# Patient Record
Sex: MALE | Race: WHITE | NOT HISPANIC OR LATINO | Employment: OTHER | URBAN - METROPOLITAN AREA
[De-identification: names, ages, dates, MRNs, and addresses within clinical notes are randomized per-mention and may not be internally consistent; named-entity substitution may affect disease eponyms.]

---

## 2023-03-13 ENCOUNTER — TELEPHONE (OUTPATIENT)
Dept: PRIMARY CARE | Facility: CLINIC | Age: 68
End: 2023-03-13

## 2023-03-13 NOTE — TELEPHONE ENCOUNTER
Patient needs a refil on Atorvastatin 20mg, Metoprolol 50mg and Metformin 4mg to [facility] in [place]

## 2023-03-15 DIAGNOSIS — E11.9 TYPE 2 DIABETES MELLITUS WITHOUT COMPLICATION, WITHOUT LONG-TERM CURRENT USE OF INSULIN (MULTI): ICD-10-CM

## 2023-03-15 DIAGNOSIS — E78.2 MIXED HYPERLIPIDEMIA: ICD-10-CM

## 2023-03-15 DIAGNOSIS — I10 PRIMARY HYPERTENSION: Primary | ICD-10-CM

## 2023-03-15 RX ORDER — AMOXICILLIN 500 MG
CAPSULE ORAL 2 TIMES DAILY
COMMUNITY
End: 2023-12-06 | Stop reason: ALTCHOICE

## 2023-03-15 RX ORDER — GLIMEPIRIDE 4 MG/1
4 TABLET ORAL 2 TIMES DAILY
COMMUNITY
End: 2023-03-16 | Stop reason: SDUPTHER

## 2023-03-15 RX ORDER — LISINOPRIL 20 MG/1
20 TABLET ORAL DAILY
COMMUNITY
End: 2023-03-16 | Stop reason: SDUPTHER

## 2023-03-15 RX ORDER — EZETIMIBE 10 MG/1
10 TABLET ORAL DAILY
COMMUNITY
End: 2023-03-16 | Stop reason: SDUPTHER

## 2023-03-15 RX ORDER — DULAGLUTIDE 3 MG/.5ML
3 INJECTION, SOLUTION SUBCUTANEOUS
COMMUNITY
End: 2023-04-03 | Stop reason: SDUPTHER

## 2023-03-15 RX ORDER — MECLIZINE HYDROCHLORIDE 25 MG/1
25 TABLET ORAL 3 TIMES DAILY PRN
COMMUNITY
Start: 2018-02-15 | End: 2023-12-06 | Stop reason: ALTCHOICE

## 2023-03-15 RX ORDER — ATORVASTATIN CALCIUM 20 MG/1
20 TABLET, FILM COATED ORAL DAILY
COMMUNITY
End: 2023-03-16 | Stop reason: SDUPTHER

## 2023-03-15 RX ORDER — AMLODIPINE BESYLATE 5 MG/1
5 TABLET ORAL DAILY
COMMUNITY
End: 2023-03-16 | Stop reason: ENTERED-IN-ERROR

## 2023-03-15 RX ORDER — METOPROLOL SUCCINATE 50 MG/1
50 TABLET, EXTENDED RELEASE ORAL DAILY
COMMUNITY
End: 2023-03-16 | Stop reason: SDUPTHER

## 2023-03-15 RX ORDER — DICLOFENAC SODIUM 20 MG/G
SOLUTION TOPICAL
COMMUNITY
Start: 2019-10-10 | End: 2023-12-06 | Stop reason: ALTCHOICE

## 2023-03-15 RX ORDER — ASPIRIN 81 MG/1
81 TABLET ORAL 2 TIMES DAILY
COMMUNITY

## 2023-03-15 RX ORDER — METFORMIN HYDROCHLORIDE 500 MG/1
1000 TABLET ORAL 2 TIMES DAILY
COMMUNITY
End: 2023-03-16 | Stop reason: SDUPTHER

## 2023-03-16 RX ORDER — METOPROLOL SUCCINATE 50 MG/1
TABLET, EXTENDED RELEASE ORAL
Qty: 90 TABLET | Refills: 3 | Status: SHIPPED | OUTPATIENT
Start: 2023-03-16 | End: 2023-04-03 | Stop reason: SDUPTHER

## 2023-03-16 RX ORDER — METFORMIN HYDROCHLORIDE 500 MG/1
TABLET ORAL
Qty: 360 TABLET | Refills: 3 | Status: SHIPPED | OUTPATIENT
Start: 2023-03-16 | End: 2023-04-03 | Stop reason: SDUPTHER

## 2023-03-16 RX ORDER — LISINOPRIL 20 MG/1
TABLET ORAL
Qty: 90 TABLET | Refills: 3 | Status: SHIPPED | OUTPATIENT
Start: 2023-03-16 | End: 2023-04-03 | Stop reason: SDUPTHER

## 2023-03-16 RX ORDER — AMLODIPINE BESYLATE 5 MG/1
TABLET ORAL
Qty: 90 TABLET | Refills: 3 | Status: SHIPPED | OUTPATIENT
Start: 2023-03-16 | End: 2023-04-03 | Stop reason: SDUPTHER

## 2023-03-16 RX ORDER — ATORVASTATIN CALCIUM 20 MG/1
TABLET, FILM COATED ORAL
Qty: 90 TABLET | Refills: 3 | Status: SHIPPED | OUTPATIENT
Start: 2023-03-16 | End: 2023-04-03 | Stop reason: SDUPTHER

## 2023-03-16 RX ORDER — EZETIMIBE 10 MG/1
TABLET ORAL
Qty: 90 TABLET | Refills: 3 | Status: SHIPPED | OUTPATIENT
Start: 2023-03-16 | End: 2023-04-03 | Stop reason: SDUPTHER

## 2023-03-16 RX ORDER — GLIMEPIRIDE 4 MG/1
TABLET ORAL
Qty: 180 TABLET | Refills: 3 | Status: SHIPPED | OUTPATIENT
Start: 2023-03-16 | End: 2023-04-03 | Stop reason: SDUPTHER

## 2023-03-16 NOTE — TELEPHONE ENCOUNTER
Patient called to follow up on refill request. He is completely out of Lipitor, Zetia, Metoprolol, and almost out of Metformin and trulicity . Please send to [facility] in [address]

## 2023-04-01 PROBLEM — H35.033 HYPERTENSIVE RETINOPATHY OF BOTH EYES: Status: ACTIVE | Noted: 2023-04-01

## 2023-04-01 PROBLEM — H54.61 VISION LOSS OF RIGHT EYE: Status: ACTIVE | Noted: 2023-04-01

## 2023-04-01 PROBLEM — R35.0 FREQUENCY OF URINATION: Status: ACTIVE | Noted: 2023-04-01

## 2023-04-01 PROBLEM — E66.9 CLASS 1 OBESITY WITH BODY MASS INDEX (BMI) OF 33.0 TO 33.9 IN ADULT: Status: ACTIVE | Noted: 2023-04-01

## 2023-04-01 PROBLEM — E11.59 TYPE 2 DIABETES MELLITUS WITH CIRCULATORY DISORDER (MULTI): Status: ACTIVE | Noted: 2023-04-01

## 2023-04-01 PROBLEM — E78.5 HYPERLIPIDEMIA: Status: ACTIVE | Noted: 2023-04-01

## 2023-04-01 PROBLEM — R80.9 PROTEINURIA: Status: ACTIVE | Noted: 2023-04-01

## 2023-04-01 PROBLEM — H52.7 REFRACTION ERROR: Status: ACTIVE | Noted: 2023-04-01

## 2023-04-01 PROBLEM — H04.123 DRY EYES, BILATERAL: Status: ACTIVE | Noted: 2023-04-01

## 2023-04-01 PROBLEM — I10 HYPERTENSION: Status: ACTIVE | Noted: 2023-04-01

## 2023-04-01 PROBLEM — H25.811 COMBINED FORM OF AGE-RELATED CATARACT, RIGHT EYE: Status: ACTIVE | Noted: 2023-04-01

## 2023-04-01 PROBLEM — E11.9 DIABETES (MULTI): Status: ACTIVE | Noted: 2023-04-01

## 2023-04-01 PROBLEM — G89.29 CHRONIC PAIN OF RIGHT KNEE: Status: ACTIVE | Noted: 2023-04-01

## 2023-04-01 PROBLEM — M25.511 PAIN, JOINT, SHOULDER REGION, RIGHT: Status: ACTIVE | Noted: 2023-04-01

## 2023-04-01 PROBLEM — M25.561 CHRONIC PAIN OF RIGHT KNEE: Status: ACTIVE | Noted: 2023-04-01

## 2023-04-01 PROBLEM — E55.9 VITAMIN D DEFICIENCY: Status: ACTIVE | Noted: 2023-04-01

## 2023-04-01 PROBLEM — H57.04: Status: ACTIVE | Noted: 2023-04-01

## 2023-04-01 RX ORDER — DICLOFENAC SODIUM 20 MG/G
1 SOLUTION TOPICAL 2 TIMES DAILY
COMMUNITY
End: 2023-12-06 | Stop reason: ALTCHOICE

## 2023-04-03 ENCOUNTER — OFFICE VISIT (OUTPATIENT)
Dept: PRIMARY CARE | Facility: CLINIC | Age: 68
End: 2023-04-03
Payer: MEDICARE

## 2023-04-03 VITALS
SYSTOLIC BLOOD PRESSURE: 120 MMHG | TEMPERATURE: 97.9 F | BODY MASS INDEX: 31.47 KG/M2 | WEIGHT: 219.8 LBS | HEIGHT: 70 IN | DIASTOLIC BLOOD PRESSURE: 80 MMHG

## 2023-04-03 DIAGNOSIS — R80.1 PERSISTENT PROTEINURIA: ICD-10-CM

## 2023-04-03 DIAGNOSIS — R53.83 FATIGUE, UNSPECIFIED TYPE: ICD-10-CM

## 2023-04-03 DIAGNOSIS — N52.9 ERECTILE DISORDER: ICD-10-CM

## 2023-04-03 DIAGNOSIS — E11.9 TYPE 2 DIABETES MELLITUS WITHOUT COMPLICATION, WITHOUT LONG-TERM CURRENT USE OF INSULIN (MULTI): ICD-10-CM

## 2023-04-03 DIAGNOSIS — G89.29 CHRONIC MIDLINE LOW BACK PAIN WITHOUT SCIATICA: ICD-10-CM

## 2023-04-03 DIAGNOSIS — E55.9 VITAMIN D DEFICIENCY: ICD-10-CM

## 2023-04-03 DIAGNOSIS — Z00.00 MEDICARE ANNUAL WELLNESS VISIT, SUBSEQUENT: Primary | ICD-10-CM

## 2023-04-03 DIAGNOSIS — I10 PRIMARY HYPERTENSION: ICD-10-CM

## 2023-04-03 DIAGNOSIS — E78.2 MIXED HYPERLIPIDEMIA: ICD-10-CM

## 2023-04-03 DIAGNOSIS — M54.50 CHRONIC MIDLINE LOW BACK PAIN WITHOUT SCIATICA: ICD-10-CM

## 2023-04-03 LAB
POC HDL CHOLESTEROL: 42 MG/DL (ref 0–40)
POC HEMOGLOBIN A1C: 9 % (ref 4.2–6.5)
POC LDL CHOLESTEROL: 64 MG/DL (ref 0–100)
POC NON-HDL CHOLESTEROL: 82 MG/DL (ref 0–130)
POC TOTAL CHOLESTEROL/HDL RATIO: 3 (ref 0–4.5)
POC TOTAL CHOLESTEROL: 124 MG/DL (ref 0–199)
POC TRIGLYCERIDES: 89 MG/DL (ref 0–150)

## 2023-04-03 PROCEDURE — 1036F TOBACCO NON-USER: CPT | Performed by: FAMILY MEDICINE

## 2023-04-03 PROCEDURE — 83036 HEMOGLOBIN GLYCOSYLATED A1C: CPT | Performed by: FAMILY MEDICINE

## 2023-04-03 PROCEDURE — 3079F DIAST BP 80-89 MM HG: CPT | Performed by: FAMILY MEDICINE

## 2023-04-03 PROCEDURE — 1160F RVW MEDS BY RX/DR IN RCRD: CPT | Performed by: FAMILY MEDICINE

## 2023-04-03 PROCEDURE — 80061 LIPID PANEL: CPT | Performed by: FAMILY MEDICINE

## 2023-04-03 PROCEDURE — 36416 COLLJ CAPILLARY BLOOD SPEC: CPT | Performed by: FAMILY MEDICINE

## 2023-04-03 PROCEDURE — 1159F MED LIST DOCD IN RCRD: CPT | Performed by: FAMILY MEDICINE

## 2023-04-03 PROCEDURE — 1170F FXNL STATUS ASSESSED: CPT | Performed by: FAMILY MEDICINE

## 2023-04-03 PROCEDURE — 3074F SYST BP LT 130 MM HG: CPT | Performed by: FAMILY MEDICINE

## 2023-04-03 PROCEDURE — G0439 PPPS, SUBSEQ VISIT: HCPCS | Performed by: FAMILY MEDICINE

## 2023-04-03 PROCEDURE — 4010F ACE/ARB THERAPY RXD/TAKEN: CPT | Performed by: FAMILY MEDICINE

## 2023-04-03 RX ORDER — ATORVASTATIN CALCIUM 20 MG/1
20 TABLET, FILM COATED ORAL DAILY
Qty: 90 TABLET | Refills: 3 | Status: SHIPPED | OUTPATIENT
Start: 2023-04-03 | End: 2023-08-08 | Stop reason: SDUPTHER

## 2023-04-03 RX ORDER — METOPROLOL SUCCINATE 50 MG/1
50 TABLET, EXTENDED RELEASE ORAL DAILY
Qty: 90 TABLET | Refills: 3 | Status: SHIPPED | OUTPATIENT
Start: 2023-04-03 | End: 2023-08-08 | Stop reason: SDUPTHER

## 2023-04-03 RX ORDER — LISINOPRIL 20 MG/1
20 TABLET ORAL DAILY
Qty: 90 TABLET | Refills: 3 | Status: SHIPPED | OUTPATIENT
Start: 2023-04-03 | End: 2023-08-08 | Stop reason: SDUPTHER

## 2023-04-03 RX ORDER — AMLODIPINE BESYLATE 5 MG/1
5 TABLET ORAL DAILY
Qty: 90 TABLET | Refills: 3 | Status: SHIPPED | OUTPATIENT
Start: 2023-04-03 | End: 2023-08-08 | Stop reason: SDUPTHER

## 2023-04-03 RX ORDER — METFORMIN HYDROCHLORIDE 500 MG/1
1000 TABLET ORAL
Qty: 360 TABLET | Refills: 3 | Status: SHIPPED | OUTPATIENT
Start: 2023-04-03 | End: 2023-08-08 | Stop reason: SDUPTHER

## 2023-04-03 RX ORDER — EZETIMIBE 10 MG/1
10 TABLET ORAL DAILY
Qty: 90 TABLET | Refills: 3 | Status: SHIPPED | OUTPATIENT
Start: 2023-04-03 | End: 2023-08-08 | Stop reason: SDUPTHER

## 2023-04-03 RX ORDER — DULAGLUTIDE 3 MG/.5ML
3 INJECTION, SOLUTION SUBCUTANEOUS
Qty: 1 ML | Refills: 11 | Status: SHIPPED | OUTPATIENT
Start: 2023-04-03 | End: 2023-08-08 | Stop reason: SDUPTHER

## 2023-04-03 RX ORDER — GLIMEPIRIDE 4 MG/1
4 TABLET ORAL 2 TIMES DAILY
Qty: 180 TABLET | Refills: 3 | Status: SHIPPED | OUTPATIENT
Start: 2023-04-03 | End: 2023-08-08 | Stop reason: SDUPTHER

## 2023-04-03 ASSESSMENT — ACTIVITIES OF DAILY LIVING (ADL)
BATHING: INDEPENDENT
DOING_HOUSEWORK: INDEPENDENT
TAKING_MEDICATION: INDEPENDENT
MANAGING_FINANCES: INDEPENDENT
GROCERY_SHOPPING: INDEPENDENT
GROCERY_SHOPPING: INDEPENDENT
TAKING_MEDICATION: INDEPENDENT
DRESSING: INDEPENDENT
DRESSING: INDEPENDENT
MANAGING_FINANCES: INDEPENDENT
DOING_HOUSEWORK: INDEPENDENT
BATHING: INDEPENDENT

## 2023-04-03 ASSESSMENT — ENCOUNTER SYMPTOMS
ABDOMINAL PAIN: 0
AGITATION: 0
SEIZURES: 0
DECREASED CONCENTRATION: 0
BRUISES/BLEEDS EASILY: 0
EYE ITCHING: 0
WEAKNESS: 0
ABDOMINAL DISTENTION: 0
HEADACHES: 0
HEMATURIA: 0
ADENOPATHY: 0
FREQUENCY: 0
NAUSEA: 0
SORE THROAT: 0
FATIGUE: 0
COUGH: 0
SHORTNESS OF BREATH: 0
SINUS PRESSURE: 0
ANAL BLEEDING: 0
SLEEP DISTURBANCE: 0
EYE DISCHARGE: 0
APPETITE CHANGE: 0
NECK STIFFNESS: 0
TREMORS: 0
WHEEZING: 0
DIAPHORESIS: 0
BLOOD IN STOOL: 0
VOMITING: 0
NERVOUS/ANXIOUS: 0
EYE PAIN: 0
SPEECH DIFFICULTY: 0
VOICE CHANGE: 0
PHOTOPHOBIA: 0
TROUBLE SWALLOWING: 0
CHILLS: 0
DIZZINESS: 0
NECK PAIN: 0
PALPITATIONS: 0
UNEXPECTED WEIGHT CHANGE: 0
CHEST TIGHTNESS: 0
CONSTIPATION: 0
CONFUSION: 0
BACK PAIN: 0
DIARRHEA: 0
FEVER: 0
NUMBNESS: 0
ARTHRALGIAS: 0
EYE REDNESS: 0

## 2023-04-03 ASSESSMENT — PATIENT HEALTH QUESTIONNAIRE - PHQ9
2. FEELING DOWN, DEPRESSED OR HOPELESS: NOT AT ALL
2. FEELING DOWN, DEPRESSED OR HOPELESS: NOT AT ALL
1. LITTLE INTEREST OR PLEASURE IN DOING THINGS: NOT AT ALL
SUM OF ALL RESPONSES TO PHQ9 QUESTIONS 1 AND 2: 0
1. LITTLE INTEREST OR PLEASURE IN DOING THINGS: NOT AT ALL
SUM OF ALL RESPONSES TO PHQ9 QUESTIONS 1 AND 2: 0
2. FEELING DOWN, DEPRESSED OR HOPELESS: NOT AT ALL
SUM OF ALL RESPONSES TO PHQ9 QUESTIONS 1 AND 2: 0
1. LITTLE INTEREST OR PLEASURE IN DOING THINGS: NOT AT ALL

## 2023-04-03 NOTE — PROGRESS NOTES
Subjective   Reason for Visit: Bam Arriaza is an 67 y.o. male here for a Medicare Wellness visit.          Reviewed all medications by prescribing practitioner or clinical pharmacist (such as prescriptions, OTCs, herbal therapies and supplements) and documented in the medical record.    HPI  #1 Health Maintenance:  DTaP 2014, received another booster on January 8, 2020 in anticipation of his granddaughter's birth  PSA 5/21/2012 1.69 7/22/2013 1.06 slip written for another one in 12/15/2014, 7/15/2015 value 1.24, 6/28/2017 value 1.58, July 2018, 8/20/18 0.86 August 28, 2019 value 0.84  Coronary artery calcium scan: 2008 319, 6/21/2017 score 824.7 (7/3/2017 recommended appointment with Dr. Vaughn)  cardiac catheterization August 18, 2017 showed left main LAD and circumflex and right coronary all less than 30%  Pneumovax at age 65  Colonoscopy will refer to Dr. Gonzales he is planning on getting this done sometime during the spring of 2016, 9/29/2016 once he gets a couple more bills 3/24/2017 still pending, will do FIT test in interim, April 23, 2018 reminded, 8/9/2018 FIT, 12/4/2018 still pending, September 5, 2019 still pending, March 9, 2020 still pending  CMP this will be done after 7/22/2014 7/15/2015 (glucose 198), 99/29/2016, 6/28/2017 (glucose 176, total protein 6.2, BMP 12/5/2017 (glucose 198), 2/15/2018 (glucose 201), February 2019, August 28, 2019(glucose 200, total protein 6.3) plus CBC plus PSA 0.84+ TSH 1.2  August 25, 2022 CBC plus PSA 1.34+ vitamin D 43+ TSH 1.53+ CMP (glucose 130, total protein 6.1)    #2 Diabetes:  Maddie 10, 2019  Hemoglobin A1c 11.7%   microalbumin December 4, 2018 80 normal  Triglycerides 184  Sensory exam August 9, 2018 monofilament normal  Eye exam 2018 Dr. Agustin  Will increase metformin to 1 g twice daily, maintain glimepiride 4 mg twice a day and add Januvia 50 mg once a day.  If not significantly improved by next visit will refer to endocrinology    December 9,  2019  Hemoglobin A1c 7.9%  Microalbumin September 30, 2019 80 normal  Triglycerides 160  Sensory exam August 9, 2018 monofilament normal  Eye exam Dr. Agustin 2018  Has been taking the Januvia regularly and has not results have improved. We will plan on going to 100 mg if trend does not continue.    March 9, 2020  Hemoglobin A1c 7.6%  Microalbumin September 30, 2019 80 normal  Triglycerides 151  Sensory exam August 19, 2018 monofilament normal  Eye exam Dr. Agustin 2018  His hemoglobin A1c continues to improve.    August 31, 2020  Hemoglobin A1c 9%  Microalbumin August 31, 2020 albumin 150 creatinine 200 AC ratio  abnormal  Triglycerides 144  Sensory exam monofilament August 19, 2018 normal  Eye exam Dr. Agustin 2018  He admits to drinking some regular pop and has not been as vigilant since the [place] COVID 19. We will update his flu shot today, starting tomorrow he will be eligible for Medicare.    November 17, 2020    February 10, 2021  Hemoglobin A1c 8.9%  Triglycerides 145  Lipids completed  Medicare wellness exam completed. Will split metoprolol in half and take one half twice daily    May 7, 2021  Hemoglobin A1c 8.8%  Triglycerides 121  Lipids total 111 HDL 31 ratio 3.6 LDL 56 triglycerides 121  Patient just had microalbumin completed will check at the time of next visit in 3 months.    September 9, 2021  Hemoglobin A1c 8.6%  Triglycerides 134  Lipids total 102 HDL 40 ratio 5.2 LDL 56 triglycerides 134  Microalbumin September 9, 2021 albumin 30 urine creatinine 100 AC ratio abnormal     November 4, 2021  Hemoglobin A1c 8.5%  Triglycerides 115  Lipids total 110 HDL 31 ratio 3.5 LDL 55 triglycerides 118  Microalbumin September 9, 2021 albumin 30 urine creatinine 100 AC ratio     March 1, 2022  Hemoglobin A1c 7.7%  Triglycerides 152  Lipids total 132 HDL 36 ratio 3.7 LDL 66 triglycerides 152 which is also he is getting his appointment from March    November 3, 2022  Hemoglobin  8.4%  Triglycerides  Lipids    April 3rd 2023  A1C 9.0%  TG 89  Patient is on Trulicity 3 mg and still has not been successful in bringing his A1c down.  He is also still on glimepiride 4 mg twice a day and metformin 1000 mg twice daily.  I am referring him to endocrinology for diabetic management        #3 Hyperlipidemia:  Maddie 10, 2019 total 146 HDL 34 ratio 4.3 LDL 75 triglycerides 184  March 9, 2020 total 133 HDL 35 ratio 3.8 LDL 67 triglycerides 150  August 31, 2020 total 102 HDL 29 ratio 3.6 LDL 45 triglycerides 144  November 17, 2020 total 110 HDL 33 ratio 3.3 LDL 51 triglycerides 129  February 10, 2021 total 118 HDL 33 ratio 3.6 LDL 56 triglycerides 143  May 7, 2021 total 111 HDL 31 ratio 3.6 LDL 56 triglycerides 121  September 9, 2021 total 102 HDL 20 ratio 5.2 LDL 56 triglycerides 134  November 4, 2021 total 110 HDL 31 ratio 3.5 LDL 55 triglycerides 118  March 1, 2022 total 132 HDL 36 ratio 3.7 LDL 66 triglycerides 152      April 3, 2023 total 124 HDL 42 ratio   3:1 LDL 64 triglycerides 89      #4 Hypertension:  The patient is taking amlodipine 5 mg once a day and lisinopril 10 mg once a day   12/4/2018 blood pressure 150/80 weight 236 pounds  Maddie 10, 2019 blood pressure 130/86 weight 228 pounds  August 31, 2020 blood pressure 130/80 weight 224 pounds  April 3, 2023 blood pressure 120/80 weight 219 pounds    April 3, 2023   this 67-year-old male is here today for his Medicare wellness exam.  His hemoglobin A1c remains elevated at 9 despite started on Trulicity 3 mg.  I am sending him to endocrinology for an opinion regarding management of his diabetes.    March 1, 2022  This 66-year-old male is here today for his Medicare wellness exam. He is also here to follow-up on his diabetes hyperlipidemia as well as his right hip. He would like a referral to a surgeon closer and I will send him to Dr. Hayes at the [facility]. He also slipped on the ice a week ago yesterday and landed on his bottom with  his hands behind him trying to break the fall. As a result he has significant back pain.  He was able to get his hemoglobin A1c down to 7.7%. He has not started taking the Trulicity. He recently changed to [facility]. Trulicity is covered although his Januvia is not. I will plan on seeing him back in 6 months for his normal follow-up.    November 4, 2021  This 66-year-old male is here today to follow-up on his diabetes. Unfortunately his numbers are not coming down very well.    March 9, 2020  Results continue to improve. We will renew medications for 6 months he will be due at that time for PSA CMP TSH CBC and microalbumin and fit test.     12/4/2018  Patient is back on his medications but unfortunately his hemoglobin A1c has gone up to 10.5%. We will increase his glimepiride 4 mg in the morning and 2 mg with dinner    8/9/2018  This 62-year-old males here today to follow-up on his diabetes. Unfortunately his hemoglobin A1c has gone up to 9.3%. He attributes this to losing his health insurance. He otherwise has been feeling well and is now covered under his wife's plan. He continues to have significant protein leakage.  I will plan on seeing him back in 3 months. We will at that time recheck his regular labs. He will need an additional lab work including CMP and CBC in 6 months.    April 23, 2018  Patient had run out of atorvastatin. He is due back in 3 months. He needs to get his urine rechecked at that time.    1/4/2018  Patient did get in to see Dr. Agustin. His prescription is changing. He has been feeling well since his last visit and other than some occasional feelings of disequilibrium he is otherwise doing well and his blood pressure is much better with 20 mg of lisinopril. I will plan on seeing him back in 3 months with a significant emphasis on weight loss and exercise    12/19/2017  This 62-year-old male was evaluated in the emergency room at [facility] on December 5 for increase in blood pressure and some visual  changes that occurred while he was sitting at his desk working. An MRI and CT scan was done and did not reveal any specific abnormalities. He was diagnosed as having a TIA. It was recommended he follow-up with his regular family doctor regarding better control of his blood pressure. Since the episode he has had difficulty seeing his computer without using a magnifying glass. He had an appointment set up in February to see an ophthalmologist we have move that appointment up to December 26. I am also increasing his lisinopril to 20 mg once a day. He will continue to take metoprolol 50 mg once a day and amlodipine 5 mg once a day. His repeat blood pressure taken by me was 150/80. When on seeing him back in 2 weeks to recheck his blood pressure.     Patient Care Team:  Howie Tafoya MD as PCP - General  Howie Tafoya MD as PCP - [employer] PCP     Review of Systems   Constitutional:  Negative for appetite change, chills, diaphoresis, fatigue, fever and unexpected weight change.   HENT:  Negative for congestion, ear discharge, ear pain, hearing loss, nosebleeds, sinus pressure, sore throat, tinnitus, trouble swallowing and voice change.    Eyes:  Negative for photophobia, pain, discharge, redness, itching and visual disturbance.   Respiratory:  Negative for cough, chest tightness, shortness of breath and wheezing.    Cardiovascular:  Negative for chest pain, palpitations and leg swelling.   Gastrointestinal:  Negative for abdominal distention, abdominal pain, anal bleeding, blood in stool, constipation, diarrhea, nausea and vomiting.   Endocrine: Negative for polyuria.   Genitourinary:  Negative for frequency and hematuria.   Musculoskeletal:  Negative for arthralgias, back pain, neck pain and neck stiffness.   Skin:  Negative for rash.   Allergic/Immunologic: Negative for environmental allergies and food allergies.   Neurological:  Negative for dizziness, tremors, seizures, syncope, speech  "difficulty, weakness, numbness and headaches.   Hematological:  Negative for adenopathy. Does not bruise/bleed easily.   Psychiatric/Behavioral:  Negative for agitation, behavioral problems, confusion, decreased concentration, sleep disturbance and suicidal ideas. The patient is not nervous/anxious.        Objective   Vitals:  /80 (BP Location: Right arm, Patient Position: Sitting, BP Cuff Size: Adult)   Temp 36.6 °C (97.9 °F) (Temporal)   Ht 1.777 m (5' 9.96\")   Wt 99.7 kg (219 lb 12.8 oz)   BMI 31.57 kg/m²       Physical Exam  Constitutional:       General: He is not in acute distress.     Appearance: Normal appearance. He is normal weight. He is not ill-appearing.   HENT:      Head: Normocephalic.      Right Ear: Tympanic membrane and ear canal normal. There is no impacted cerumen.      Left Ear: Tympanic membrane and ear canal normal.      Nose: Nose normal. No congestion or rhinorrhea.      Mouth/Throat:      Mouth: Mucous membranes are moist.      Pharynx: Oropharynx is clear.   Eyes:      Extraocular Movements: Extraocular movements intact.      Conjunctiva/sclera: Conjunctivae normal.      Pupils: Pupils are equal, round, and reactive to light.   Neck:      Vascular: No carotid bruit.   Cardiovascular:      Rate and Rhythm: Normal rate and regular rhythm.      Pulses: Normal pulses.      Heart sounds: Normal heart sounds. No murmur heard.  Pulmonary:      Effort: Pulmonary effort is normal. No respiratory distress.      Breath sounds: No wheezing or rales.   Abdominal:      General: Abdomen is flat. Bowel sounds are normal. There is no distension.      Palpations: There is no mass.      Tenderness: There is no abdominal tenderness. There is no guarding or rebound.      Hernia: No hernia is present.   Genitourinary:     Rectum: Normal.   Musculoskeletal:         General: No swelling, tenderness or deformity. Normal range of motion.      Cervical back: Normal range of motion and neck supple. No " tenderness.      Right lower leg: No edema.      Left lower leg: No edema.   Lymphadenopathy:      Cervical: No cervical adenopathy.   Skin:     General: Skin is warm and dry.      Findings: No erythema or rash.   Neurological:      Mental Status: He is alert.         Assessment/Plan   Problem List Items Addressed This Visit          Circulatory    Hypertension    Relevant Medications    atorvastatin (Lipitor) 20 mg tablet    glimepiride (Amaryl) 4 mg tablet    amLODIPine (Norvasc) 5 mg tablet    metoprolol succinate XL (Toprol-XL) 50 mg 24 hr tablet    lisinopril 20 mg tablet       Endocrine/Metabolic    Diabetes (CMS/HCC)    Relevant Medications    glimepiride (Amaryl) 4 mg tablet    lisinopril 20 mg tablet    metFORMIN (Glucophage) 500 mg tablet    dulaglutide (Trulicity) 3 mg/0.5 mL pen injector    Other Relevant Orders    Collection of Capillary Blood Specimen    POCT glycosylated hemoglobin (Hb A1C) manually resulted (Completed)    Referral to Endocrinology    Vitamin D deficiency       Other    Hyperlipidemia    Relevant Medications    ezetimibe (Zetia) 10 mg tablet    atorvastatin (Lipitor) 20 mg tablet    glimepiride (Amaryl) 4 mg tablet    Other Relevant Orders    Collection of Capillary Blood Specimen    POCT Lipid Panel manually resulted (Completed)    Proteinuria     Other Visit Diagnoses       Medicare annual wellness visit, subsequent    -  Primary    Erectile disorder        Chronic midline low back pain without sciatica        Fatigue, unspecified type

## 2023-08-08 ENCOUNTER — OFFICE VISIT (OUTPATIENT)
Dept: PRIMARY CARE | Facility: CLINIC | Age: 68
End: 2023-08-08
Payer: MEDICARE

## 2023-08-08 VITALS — BODY MASS INDEX: 31.34 KG/M2 | WEIGHT: 218.2 LBS | DIASTOLIC BLOOD PRESSURE: 88 MMHG | SYSTOLIC BLOOD PRESSURE: 160 MMHG

## 2023-08-08 DIAGNOSIS — E78.5 HYPERLIPIDEMIA, UNSPECIFIED HYPERLIPIDEMIA TYPE: ICD-10-CM

## 2023-08-08 DIAGNOSIS — Z12.5 SCREENING FOR PROSTATE CANCER: ICD-10-CM

## 2023-08-08 DIAGNOSIS — E11.9 TYPE 2 DIABETES MELLITUS WITHOUT COMPLICATION, WITHOUT LONG-TERM CURRENT USE OF INSULIN (MULTI): ICD-10-CM

## 2023-08-08 DIAGNOSIS — E78.2 MIXED HYPERLIPIDEMIA: ICD-10-CM

## 2023-08-08 DIAGNOSIS — I10 PRIMARY HYPERTENSION: ICD-10-CM

## 2023-08-08 DIAGNOSIS — E13.9 DIABETES 1.5, MANAGED AS TYPE 2 (MULTI): Primary | ICD-10-CM

## 2023-08-08 LAB
POC HDL CHOLESTEROL: 28 MG/DL (ref 0–40)
POC HEMOGLOBIN A1C: 8 % (ref 4.2–6.5)
POC LDL CHOLESTEROL: 53 MG/DL (ref 0–100)
POC NON-HDL CHOLESTEROL: 72 MG/DL (ref 0–130)
POC TOTAL CHOLESTEROL/HDL RATIO: 3.6 (ref 0–4.5)
POC TOTAL CHOLESTEROL: 100 MG/DL (ref 0–199)
POC TRIGLYCERIDES: 98 MG/DL (ref 0–150)

## 2023-08-08 PROCEDURE — 83036 HEMOGLOBIN GLYCOSYLATED A1C: CPT | Performed by: INTERNAL MEDICINE

## 2023-08-08 PROCEDURE — 1160F RVW MEDS BY RX/DR IN RCRD: CPT | Performed by: INTERNAL MEDICINE

## 2023-08-08 PROCEDURE — 1036F TOBACCO NON-USER: CPT | Performed by: INTERNAL MEDICINE

## 2023-08-08 PROCEDURE — 99213 OFFICE O/P EST LOW 20 MIN: CPT | Performed by: INTERNAL MEDICINE

## 2023-08-08 PROCEDURE — 1159F MED LIST DOCD IN RCRD: CPT | Performed by: INTERNAL MEDICINE

## 2023-08-08 PROCEDURE — 4010F ACE/ARB THERAPY RXD/TAKEN: CPT | Performed by: INTERNAL MEDICINE

## 2023-08-08 PROCEDURE — 3079F DIAST BP 80-89 MM HG: CPT | Performed by: INTERNAL MEDICINE

## 2023-08-08 PROCEDURE — 3077F SYST BP >= 140 MM HG: CPT | Performed by: INTERNAL MEDICINE

## 2023-08-08 PROCEDURE — 80061 LIPID PANEL: CPT | Performed by: INTERNAL MEDICINE

## 2023-08-08 RX ORDER — DULAGLUTIDE 3 MG/.5ML
3 INJECTION, SOLUTION SUBCUTANEOUS
Qty: 1 ML | Refills: 3 | Status: SHIPPED | OUTPATIENT
Start: 2023-08-08 | End: 2024-05-06

## 2023-08-08 RX ORDER — EZETIMIBE 10 MG/1
10 TABLET ORAL DAILY
Qty: 90 TABLET | Refills: 1 | Status: SHIPPED | OUTPATIENT
Start: 2023-08-08 | End: 2024-03-14

## 2023-08-08 RX ORDER — AMLODIPINE BESYLATE 5 MG/1
5 TABLET ORAL DAILY
Qty: 90 TABLET | Refills: 0 | Status: SHIPPED | OUTPATIENT
Start: 2023-08-08 | End: 2023-12-06 | Stop reason: SDUPTHER

## 2023-08-08 RX ORDER — LISINOPRIL 20 MG/1
20 TABLET ORAL DAILY
Qty: 90 TABLET | Refills: 0 | Status: SHIPPED | OUTPATIENT
Start: 2023-08-08 | End: 2024-02-28

## 2023-08-08 RX ORDER — ATORVASTATIN CALCIUM 20 MG/1
20 TABLET, FILM COATED ORAL DAILY
Qty: 90 TABLET | Refills: 0 | Status: SHIPPED | OUTPATIENT
Start: 2023-08-08 | End: 2023-09-13

## 2023-08-08 RX ORDER — GLIMEPIRIDE 4 MG/1
4 TABLET ORAL 2 TIMES DAILY
Qty: 180 TABLET | Refills: 1 | Status: SHIPPED | OUTPATIENT
Start: 2023-08-08 | End: 2023-12-13

## 2023-08-08 RX ORDER — METFORMIN HYDROCHLORIDE 1000 MG/1
1000 TABLET ORAL
Qty: 90 TABLET | Refills: 1 | Status: SHIPPED | OUTPATIENT
Start: 2023-08-08 | End: 2023-09-13

## 2023-08-08 RX ORDER — METOPROLOL SUCCINATE 50 MG/1
50 TABLET, EXTENDED RELEASE ORAL DAILY
Qty: 90 TABLET | Refills: 1 | Status: SHIPPED | OUTPATIENT
Start: 2023-08-08 | End: 2023-12-13

## 2023-08-08 ASSESSMENT — PATIENT HEALTH QUESTIONNAIRE - PHQ9
SUM OF ALL RESPONSES TO PHQ9 QUESTIONS 1 AND 2: 0
2. FEELING DOWN, DEPRESSED OR HOPELESS: NOT AT ALL
1. LITTLE INTEREST OR PLEASURE IN DOING THINGS: NOT AT ALL

## 2023-08-08 NOTE — PATIENT INSTRUCTIONS
Medicare wellness  visit in December.  Discussed lab result HbA1c goal would be 7-8%  He will start exercise  and will follow up with Endocrinologist.

## 2023-08-08 NOTE — PROGRESS NOTES
Subjective   Patient ID: [id number]   [facility] [place]    Bam Arriaza is a 67 y.o. male who presents for Labs Only (Would like in office Lipid and A1C done. Also, comprehensive blood work.  ) and Dizziness.  He has been  seeing Dr Tafoya for many years. He need medication refill and blood work. He is doing very well.      Objective   Visit Vitals  /88 (BP Location: Left arm, Patient Position: Sitting)      Review of Systems  All 12 systems reviewed, no other abnormality except that mentioned in HPI.    Physical Exam  Constitutional- no abnormality  ENT- no abnormality  Neck- no swelling  CVS- normal S1 and S2, no murmur.  Pulmonary- clear to auscultation,no rhonchi, no wheezes.  Abdomen- normal- liver and spleen, soft, no distension, bowel sound present.  Neurological- all cranial nerves intact, speech and gait normal, no sensory or motor deficiency.  Musculoskeletal- all pulses are normal, normal movement, no joint swelling.  Skin- no rash, dry.  psychiatry- no suicidal ideation.  Lymph node- no lyphadenopathy      Assessment/Plan   Problem List Items Addressed This Visit       Diabetes (CMS/[facility])    Relevant Medications    dulaglutide (Trulicity) 3 mg/0.5 mL pen injector    glimepiride (Amaryl) 4 mg tablet    lisinopril 20 mg tablet    metFORMIN (Glucophage) 1,000 mg tablet    Hyperlipidemia    Relevant Medications    atorvastatin (Lipitor) 20 mg tablet    ezetimibe (Zetia) 10 mg tablet    glimepiride (Amaryl) 4 mg tablet    Other Relevant Orders    POCT Lipid Panel manually resulted (Completed)    Hypertension    Relevant Medications    amLODIPine (Norvasc) 5 mg tablet    atorvastatin (Lipitor) 20 mg tablet    glimepiride (Amaryl) 4 mg tablet    lisinopril 20 mg tablet    metoprolol succinate XL (Toprol-XL) 50 mg 24 hr tablet     Other Visit Diagnoses       Diabetes 1.5, managed as type 2 (CMS/[facility])    -  Primary    Relevant Orders    POCT glycosylated hemoglobin (Hb A1C) manually resulted (Completed)     Comprehensive Metabolic Panel    Screening for prostate cancer        Relevant Orders    Prostate Spec.Ag,Screen        Next medicare wellness visit April 2024.    Julio Meza MD

## 2023-11-30 ENCOUNTER — LAB (OUTPATIENT)
Dept: LAB | Facility: LAB | Age: 68
End: 2023-11-30
Payer: MEDICARE

## 2023-11-30 DIAGNOSIS — Z12.5 SCREENING FOR PROSTATE CANCER: ICD-10-CM

## 2023-11-30 DIAGNOSIS — E11.65 TYPE 2 DIABETES MELLITUS WITH HYPERGLYCEMIA (MULTI): Primary | ICD-10-CM

## 2023-11-30 DIAGNOSIS — E13.9 DIABETES 1.5, MANAGED AS TYPE 2 (MULTI): ICD-10-CM

## 2023-11-30 LAB
ALBUMIN SERPL BCP-MCNC: 3.9 G/DL (ref 3.4–5)
ALP SERPL-CCNC: 53 U/L (ref 33–136)
ALT SERPL W P-5'-P-CCNC: 46 U/L (ref 10–52)
ANION GAP SERPL CALC-SCNC: 13 MMOL/L (ref 10–20)
AST SERPL W P-5'-P-CCNC: 36 U/L (ref 9–39)
BILIRUB SERPL-MCNC: 1.5 MG/DL (ref 0–1.2)
BUN SERPL-MCNC: 15 MG/DL (ref 6–23)
CALCIUM SERPL-MCNC: 9.5 MG/DL (ref 8.6–10.3)
CHLORIDE SERPL-SCNC: 104 MMOL/L (ref 98–107)
CO2 SERPL-SCNC: 26 MMOL/L (ref 21–32)
CREAT SERPL-MCNC: 0.9 MG/DL (ref 0.5–1.3)
CREAT UR-MCNC: 118.5 MG/DL (ref 20–370)
CREAT UR-MCNC: 119.4 MG/DL (ref 20–370)
GFR SERPL CREATININE-BSD FRML MDRD: >90 ML/MIN/1.73M*2
GLUCOSE SERPL-MCNC: 108 MG/DL (ref 74–99)
MICROALBUMIN UR-MCNC: 398.2 MG/L
MICROALBUMIN/CREAT UR: 333.5 UG/MG CREAT
POTASSIUM SERPL-SCNC: 4.4 MMOL/L (ref 3.5–5.3)
PROT SERPL-MCNC: 6.8 G/DL (ref 6.4–8.2)
PSA SERPL-MCNC: 0.88 NG/ML
SODIUM SERPL-SCNC: 139 MMOL/L (ref 136–145)
TSH SERPL-ACNC: 0.74 MIU/L (ref 0.44–3.98)

## 2023-11-30 PROCEDURE — 80053 COMPREHEN METABOLIC PANEL: CPT

## 2023-11-30 PROCEDURE — 82570 ASSAY OF URINE CREATININE: CPT

## 2023-11-30 PROCEDURE — 83036 HEMOGLOBIN GLYCOSYLATED A1C: CPT

## 2023-11-30 PROCEDURE — 36415 COLL VENOUS BLD VENIPUNCTURE: CPT

## 2023-11-30 PROCEDURE — G0103 PSA SCREENING: HCPCS

## 2023-11-30 PROCEDURE — 84443 ASSAY THYROID STIM HORMONE: CPT

## 2023-11-30 PROCEDURE — 82043 UR ALBUMIN QUANTITATIVE: CPT

## 2023-12-01 ENCOUNTER — TELEPHONE (OUTPATIENT)
Dept: PRIMARY CARE | Facility: CLINIC | Age: 68
End: 2023-12-01
Payer: MEDICARE

## 2023-12-01 LAB
EST. AVERAGE GLUCOSE BLD GHB EST-MCNC: 160 MG/DL
HBA1C MFR BLD: 7.2 %

## 2023-12-01 NOTE — TELEPHONE ENCOUNTER
----- Message from Julio Meza MD sent at 12/1/2023 10:08 AM EST -----  CMP- looks ok, Bili- little high abut  was elevated before, but liver enzymes normal.  ----- Message -----  From: Lab, Background User  Sent: 11/30/2023   5:14 PM EST  To: Julio Meza MD

## 2023-12-06 ENCOUNTER — OFFICE VISIT (OUTPATIENT)
Dept: PRIMARY CARE | Facility: CLINIC | Age: 68
End: 2023-12-06
Payer: MEDICARE

## 2023-12-06 VITALS
TEMPERATURE: 97.2 F | OXYGEN SATURATION: 97 % | BODY MASS INDEX: 30.05 KG/M2 | SYSTOLIC BLOOD PRESSURE: 150 MMHG | HEART RATE: 84 BPM | DIASTOLIC BLOOD PRESSURE: 80 MMHG | WEIGHT: 209.2 LBS

## 2023-12-06 DIAGNOSIS — I10 PRIMARY HYPERTENSION: ICD-10-CM

## 2023-12-06 DIAGNOSIS — J01.90 ACUTE SINUSITIS, RECURRENCE NOT SPECIFIED, UNSPECIFIED LOCATION: Primary | ICD-10-CM

## 2023-12-06 PROCEDURE — 3051F HG A1C>EQUAL 7.0%<8.0%: CPT | Performed by: INTERNAL MEDICINE

## 2023-12-06 PROCEDURE — 1160F RVW MEDS BY RX/DR IN RCRD: CPT | Performed by: INTERNAL MEDICINE

## 2023-12-06 PROCEDURE — 4010F ACE/ARB THERAPY RXD/TAKEN: CPT | Performed by: INTERNAL MEDICINE

## 2023-12-06 PROCEDURE — 3062F POS MACROALBUMINURIA REV: CPT | Performed by: INTERNAL MEDICINE

## 2023-12-06 PROCEDURE — 99213 OFFICE O/P EST LOW 20 MIN: CPT | Performed by: INTERNAL MEDICINE

## 2023-12-06 PROCEDURE — 1159F MED LIST DOCD IN RCRD: CPT | Performed by: INTERNAL MEDICINE

## 2023-12-06 PROCEDURE — 3079F DIAST BP 80-89 MM HG: CPT | Performed by: INTERNAL MEDICINE

## 2023-12-06 PROCEDURE — 1036F TOBACCO NON-USER: CPT | Performed by: INTERNAL MEDICINE

## 2023-12-06 PROCEDURE — 3077F SYST BP >= 140 MM HG: CPT | Performed by: INTERNAL MEDICINE

## 2023-12-06 RX ORDER — AMLODIPINE BESYLATE 10 MG/1
10 TABLET ORAL DAILY
Qty: 90 TABLET | Refills: 1 | Status: SHIPPED | OUTPATIENT
Start: 2023-12-06 | End: 2024-05-06 | Stop reason: SDUPTHER

## 2023-12-06 RX ORDER — AZITHROMYCIN 250 MG/1
TABLET, FILM COATED ORAL
Qty: 6 TABLET | Refills: 0 | Status: SHIPPED | OUTPATIENT
Start: 2023-12-06 | End: 2023-12-11

## 2023-12-06 ASSESSMENT — PATIENT HEALTH QUESTIONNAIRE - PHQ9
2. FEELING DOWN, DEPRESSED OR HOPELESS: NOT AT ALL
SUM OF ALL RESPONSES TO PHQ9 QUESTIONS 1 AND 2: 0
1. LITTLE INTEREST OR PLEASURE IN DOING THINGS: NOT AT ALL

## 2023-12-06 ASSESSMENT — ENCOUNTER SYMPTOMS
LOSS OF SENSATION IN FEET: 0
OCCASIONAL FEELINGS OF UNSTEADINESS: 0
DEPRESSION: 0

## 2023-12-06 NOTE — PROGRESS NOTES
Subjective   Patient ID: [id number]   HPI    Bam Arriaza is a 68 y.o. male who presents for Diabetes.  He is following with endocrinologist regularly for his diabetes and his diabetes well-controlled his hemoglobin A1c came back 7.2 from 8.4.  He is maintaining diet and exercise with medications.  His blood pressure little bit high and discussed the consequence of high blood pressure.  And blood pressure medication increased to 10 mg p.o. daily.        Objective   Visit Vitals  /80 (BP Location: Left arm, Patient Position: Sitting, BP Cuff Size: Large adult)   Pulse 84   Temp 36.2 °C (97.2 °F) (Skin)      Review of Systems  All 12 systems reviewed, no other abnormality except that mentioned in HPI.    Physical Exam  Constitutional- no abnormality  ENT- no abnormality  Neck- no swelling  CVS- normal S1 and S2, no murmur.  Pulmonary- clear to auscultation,no rhonchi, no wheezes.  Abdomen- normal- liver and spleen, soft, no distension, bowel sound present.  Neurological- all cranial nerves intact, speech and gait normal, no sensory or motor deficiency.  Musculoskeletal- all pulses are normal, normal movement, no joint swelling.  Skin- no rash, dry.  psychiatry- no suicidal ideation.  Lymph node- no lymphadenopathy      Assessment/Plan   Problem List Items Addressed This Visit       Hypertension    Relevant Medications    amLODIPine (Norvasc) 10 mg tablet     Other Visit Diagnoses       Acute sinusitis, recurrence not specified, unspecified location    -  Primary    Relevant Medications    azithromycin (Zithromax) 250 mg tablet        He will follow-up with Dr. Fan.    Julio Meza MD

## 2024-01-31 DIAGNOSIS — E11.9 TYPE 2 DIABETES MELLITUS WITHOUT COMPLICATION, WITHOUT LONG-TERM CURRENT USE OF INSULIN (MULTI): ICD-10-CM

## 2024-02-01 RX ORDER — METFORMIN HYDROCHLORIDE 1000 MG/1
1000 TABLET ORAL
Qty: 180 TABLET | Refills: 0 | Status: SHIPPED | OUTPATIENT
Start: 2024-02-01 | End: 2024-05-06 | Stop reason: SDUPTHER

## 2024-02-28 DIAGNOSIS — I10 PRIMARY HYPERTENSION: ICD-10-CM

## 2024-02-28 DIAGNOSIS — E11.9 TYPE 2 DIABETES MELLITUS WITHOUT COMPLICATION, WITHOUT LONG-TERM CURRENT USE OF INSULIN (MULTI): ICD-10-CM

## 2024-02-28 RX ORDER — LISINOPRIL 20 MG/1
20 TABLET ORAL DAILY
Qty: 90 TABLET | Refills: 0 | Status: SHIPPED | OUTPATIENT
Start: 2024-02-28 | End: 2024-03-14

## 2024-03-13 DIAGNOSIS — I10 PRIMARY HYPERTENSION: ICD-10-CM

## 2024-03-13 DIAGNOSIS — E78.2 MIXED HYPERLIPIDEMIA: ICD-10-CM

## 2024-03-13 DIAGNOSIS — E11.9 TYPE 2 DIABETES MELLITUS WITHOUT COMPLICATION, WITHOUT LONG-TERM CURRENT USE OF INSULIN (MULTI): ICD-10-CM

## 2024-03-14 RX ORDER — EZETIMIBE 10 MG/1
10 TABLET ORAL DAILY
Qty: 90 TABLET | Refills: 0 | Status: SHIPPED | OUTPATIENT
Start: 2024-03-14 | End: 2024-05-06 | Stop reason: SDUPTHER

## 2024-03-14 RX ORDER — ATORVASTATIN CALCIUM 20 MG/1
20 TABLET, FILM COATED ORAL DAILY
Qty: 90 TABLET | Refills: 0 | Status: SHIPPED | OUTPATIENT
Start: 2024-03-14 | End: 2024-05-06 | Stop reason: SDUPTHER

## 2024-03-14 RX ORDER — METOPROLOL SUCCINATE 50 MG/1
50 TABLET, EXTENDED RELEASE ORAL DAILY
Qty: 90 TABLET | Refills: 0 | Status: SHIPPED | OUTPATIENT
Start: 2024-03-14 | End: 2024-05-06 | Stop reason: SDUPTHER

## 2024-03-14 RX ORDER — LISINOPRIL 20 MG/1
20 TABLET ORAL DAILY
Qty: 90 TABLET | Refills: 0 | Status: SHIPPED | OUTPATIENT
Start: 2024-03-14 | End: 2024-05-06 | Stop reason: SDUPTHER

## 2024-04-08 ENCOUNTER — APPOINTMENT (OUTPATIENT)
Dept: PRIMARY CARE | Facility: CLINIC | Age: 69
End: 2024-04-08
Payer: MEDICARE

## 2024-05-06 ENCOUNTER — OFFICE VISIT (OUTPATIENT)
Dept: PRIMARY CARE | Facility: CLINIC | Age: 69
End: 2024-05-06
Payer: MEDICARE

## 2024-05-06 VITALS
DIASTOLIC BLOOD PRESSURE: 70 MMHG | TEMPERATURE: 97.8 F | SYSTOLIC BLOOD PRESSURE: 130 MMHG | HEIGHT: 70 IN | HEART RATE: 68 BPM | OXYGEN SATURATION: 98 % | BODY MASS INDEX: 30.41 KG/M2 | WEIGHT: 212.4 LBS

## 2024-05-06 DIAGNOSIS — Z12.5 PROSTATE CANCER SCREENING: ICD-10-CM

## 2024-05-06 DIAGNOSIS — Z87.891 FORMER SMOKER: ICD-10-CM

## 2024-05-06 DIAGNOSIS — E78.2 MIXED HYPERLIPIDEMIA: ICD-10-CM

## 2024-05-06 DIAGNOSIS — Z00.00 ROUTINE GENERAL MEDICAL EXAMINATION AT HEALTH CARE FACILITY: Primary | ICD-10-CM

## 2024-05-06 DIAGNOSIS — E11.59 TYPE 2 DIABETES MELLITUS WITH OTHER CIRCULATORY COMPLICATION, WITHOUT LONG-TERM CURRENT USE OF INSULIN (MULTI): ICD-10-CM

## 2024-05-06 DIAGNOSIS — E11.9 TYPE 2 DIABETES MELLITUS WITHOUT COMPLICATION, WITHOUT LONG-TERM CURRENT USE OF INSULIN (MULTI): ICD-10-CM

## 2024-05-06 DIAGNOSIS — Z12.11 SCREENING FOR COLON CANCER: ICD-10-CM

## 2024-05-06 DIAGNOSIS — I10 PRIMARY HYPERTENSION: ICD-10-CM

## 2024-05-06 PROBLEM — R35.0 FREQUENCY OF URINATION: Status: RESOLVED | Noted: 2023-04-01 | Resolved: 2024-05-06

## 2024-05-06 PROBLEM — M25.511 PAIN, JOINT, SHOULDER REGION, RIGHT: Status: RESOLVED | Noted: 2023-04-01 | Resolved: 2024-05-06

## 2024-05-06 RX ORDER — METOPROLOL SUCCINATE 50 MG/1
50 TABLET, EXTENDED RELEASE ORAL DAILY
Qty: 90 TABLET | Refills: 1 | Status: SHIPPED | OUTPATIENT
Start: 2024-05-06

## 2024-05-06 RX ORDER — EZETIMIBE 10 MG/1
10 TABLET ORAL DAILY
Qty: 90 TABLET | Refills: 1 | Status: SHIPPED | OUTPATIENT
Start: 2024-05-06

## 2024-05-06 RX ORDER — AMLODIPINE BESYLATE 10 MG/1
10 TABLET ORAL DAILY
Qty: 90 TABLET | Refills: 1 | Status: SHIPPED | OUTPATIENT
Start: 2024-05-06

## 2024-05-06 RX ORDER — LISINOPRIL 20 MG/1
20 TABLET ORAL DAILY
Qty: 90 TABLET | Refills: 1 | Status: SHIPPED | OUTPATIENT
Start: 2024-05-06

## 2024-05-06 RX ORDER — METFORMIN HYDROCHLORIDE 1000 MG/1
1000 TABLET ORAL
Qty: 180 TABLET | Refills: 1 | Status: SHIPPED | OUTPATIENT
Start: 2024-05-06

## 2024-05-06 RX ORDER — ATORVASTATIN CALCIUM 20 MG/1
20 TABLET, FILM COATED ORAL DAILY
Qty: 90 TABLET | Refills: 1 | Status: SHIPPED | OUTPATIENT
Start: 2024-05-06

## 2024-05-06 RX ORDER — GLIMEPIRIDE 4 MG/1
4 TABLET ORAL 2 TIMES DAILY
Qty: 180 TABLET | Refills: 0 | Status: SHIPPED | OUTPATIENT
Start: 2024-05-06 | End: 2024-06-06

## 2024-05-06 ASSESSMENT — ACTIVITIES OF DAILY LIVING (ADL)
DOING_HOUSEWORK: INDEPENDENT
BATHING: INDEPENDENT
MANAGING_FINANCES: INDEPENDENT
TAKING_MEDICATION: INDEPENDENT
DRESSING: INDEPENDENT
GROCERY_SHOPPING: INDEPENDENT

## 2024-05-06 ASSESSMENT — PATIENT HEALTH QUESTIONNAIRE - PHQ9
SUM OF ALL RESPONSES TO PHQ9 QUESTIONS 1 AND 2: 0
2. FEELING DOWN, DEPRESSED OR HOPELESS: NOT AT ALL
1. LITTLE INTEREST OR PLEASURE IN DOING THINGS: NOT AT ALL
2. FEELING DOWN, DEPRESSED OR HOPELESS: NOT AT ALL
1. LITTLE INTEREST OR PLEASURE IN DOING THINGS: NOT AT ALL
SUM OF ALL RESPONSES TO PHQ9 QUESTIONS 1 AND 2: 0

## 2024-05-06 ASSESSMENT — ENCOUNTER SYMPTOMS
DEPRESSION: 0
LOSS OF SENSATION IN FEET: 0
OCCASIONAL FEELINGS OF UNSTEADINESS: 0

## 2024-05-06 NOTE — PROGRESS NOTES
"Subjective   Patient ID: Bam Arriaza is a 68 y.o. male who presents for Medicare Annual Wellness Visit Subsequent.    68-year-old male presenting for regular follow-up for chronic conditions.    He has type 2 diabetes for which she follows with endocrinology Dr. Garcia with [facility].  Medications include Trulicity, metformin, glimepiride, Jardiance and he does use a CGM.  He has been referred for his diabetic eye examAnd his foot exam with podiatry has been scheduled.  Lab work most recently completed in November 2023    Elevated albumin creatinine ratio on urine albumin.  However creatinine was normal TSH normal A1c 7.2% PSA negative CMP within normal limits.    Other Specialists:   Endocrinology   Hearing: good  Vision: has referral to opth.      Code Status: Full Code  Living Will: yes  HCPOA: Candace Arriaza    Screening Tests:   AAA Screening: miguelred  Former Smoker: quit in 2000; 7.5 pack years.   Current Smoker: no  7.5 Pack year Smoking History  Lung Cancer Screening: not indicated.   Colon Cancer Screening: ordered cologuard  Prostate Cancer Screening: UTD, due in fall.       Objective   /70 (BP Location: Left arm, Patient Position: Sitting, BP Cuff Size: Adult)   Pulse 68   Temp 36.6 °C (97.8 °F) (Skin)   Ht 1.778 m (5' 10\")   Wt 96.3 kg (212 lb 6.4 oz)   SpO2 98%   BMI 30.48 kg/m²     Physical Exam  Constitutional:       General: He is not in acute distress.     Appearance: Normal appearance. He is not ill-appearing, toxic-appearing or diaphoretic.   HENT:      Head: Normocephalic and atraumatic.   Eyes:      Extraocular Movements: Extraocular movements intact.      Pupils: Pupils are equal, round, and reactive to light.   Cardiovascular:      Rate and Rhythm: Normal rate and regular rhythm.      Pulses: Normal pulses.      Heart sounds: Normal heart sounds.   Pulmonary:      Effort: Pulmonary effort is normal.      Breath sounds: Normal breath sounds.   Neurological:      Mental " Status: He is alert.         Assessment/Plan   Diagnoses and all orders for this visit:  Mixed hyperlipidemia  -     Lipid Panel; Future  -     atorvastatin (Lipitor) 20 mg tablet; Take 1 tablet (20 mg) by mouth once daily.  -     ezetimibe (Zetia) 10 mg tablet; Take 1 tablet (10 mg) by mouth once daily.  -     glimepiride (Amaryl) 4 mg tablet; Take 1 tablet (4 mg) by mouth 2 times a day.  Primary hypertension  -     CBC; Future  -     Comprehensive Metabolic Panel; Future  -     Vascular US abdominal aorta anuerysm AAA screening; Future  -     amLODIPine (Norvasc) 10 mg tablet; Take 1 tablet (10 mg) by mouth once daily.  -     atorvastatin (Lipitor) 20 mg tablet; Take 1 tablet (20 mg) by mouth once daily.  -     lisinopril 20 mg tablet; Take 1 tablet (20 mg) by mouth once daily. as directed  -     metoprolol succinate XL (Toprol-XL) 50 mg 24 hr tablet; Take 1 tablet (50 mg) by mouth once daily. Do not crush, split or chew  -     glimepiride (Amaryl) 4 mg tablet; Take 1 tablet (4 mg) by mouth 2 times a day.  Type 2 diabetes mellitus with other circulatory complication, without long-term current use of insulin (Multi)  -     Hemoglobin A1C; Future  Prostate cancer screening  -     PSA; Future  Screening for colon cancer  -     Cologuard® colon cancer screening; Future  Former smoker  -     Vascular US abdominal aorta anuerysm AAA screening; Future  Type 2 diabetes mellitus without complication, without long-term current use of insulin (Multi)  -     lisinopril 20 mg tablet; Take 1 tablet (20 mg) by mouth once daily. as directed  -     metFORMIN (Glucophage) 1,000 mg tablet; Take 1 tablet (1,000 mg) by mouth 2 times a day with meals.  -     glimepiride (Amaryl) 4 mg tablet; Take 1 tablet (4 mg) by mouth 2 times a day.  Routine general medical examination at health care facility        DO ESTRELLITA Jain spent a total of 33 minutes on the date of the service which included preparing to see the patient,  face-to-face patient care, completing clinical documentation, performing a medically appropriate examination, counseling and educating the patient/family/caregiver and ordering medications, tests, or procedures.

## 2024-05-14 ASSESSMENT — ACTIVITIES OF DAILY LIVING (ADL)
BATHING: INDEPENDENT
TAKING_MEDICATION: INDEPENDENT
DOING_HOUSEWORK: INDEPENDENT
GROCERY_SHOPPING: INDEPENDENT
MANAGING_FINANCES: INDEPENDENT
DRESSING: INDEPENDENT

## 2024-05-14 ASSESSMENT — PATIENT HEALTH QUESTIONNAIRE - PHQ9
2. FEELING DOWN, DEPRESSED OR HOPELESS: NOT AT ALL
1. LITTLE INTEREST OR PLEASURE IN DOING THINGS: NOT AT ALL
SUM OF ALL RESPONSES TO PHQ9 QUESTIONS 1 AND 2: 0

## 2024-05-29 ENCOUNTER — HOSPITAL ENCOUNTER (OUTPATIENT)
Dept: VASCULAR MEDICINE | Facility: HOSPITAL | Age: 69
Discharge: HOME | End: 2024-05-29
Payer: MEDICARE

## 2024-05-29 DIAGNOSIS — I10 PRIMARY HYPERTENSION: ICD-10-CM

## 2024-05-29 DIAGNOSIS — Z13.6 ENCOUNTER FOR SCREENING FOR CARDIOVASCULAR DISORDERS: ICD-10-CM

## 2024-05-29 DIAGNOSIS — Z87.891 FORMER SMOKER: ICD-10-CM

## 2024-05-29 PROCEDURE — 76706 US ABDL AORTA SCREEN AAA: CPT

## 2024-05-29 PROCEDURE — 76706 US ABDL AORTA SCREEN AAA: CPT | Performed by: INTERNAL MEDICINE

## 2024-06-05 DIAGNOSIS — I10 PRIMARY HYPERTENSION: ICD-10-CM

## 2024-06-05 DIAGNOSIS — E78.2 MIXED HYPERLIPIDEMIA: ICD-10-CM

## 2024-06-05 DIAGNOSIS — E11.9 TYPE 2 DIABETES MELLITUS WITHOUT COMPLICATION, WITHOUT LONG-TERM CURRENT USE OF INSULIN (MULTI): ICD-10-CM

## 2024-06-06 RX ORDER — GLIMEPIRIDE 4 MG/1
4 TABLET ORAL 2 TIMES DAILY
Qty: 180 TABLET | Refills: 0 | Status: SHIPPED | OUTPATIENT
Start: 2024-06-06

## 2024-06-14 ENCOUNTER — APPOINTMENT (OUTPATIENT)
Dept: PRIMARY CARE | Facility: CLINIC | Age: 69
End: 2024-06-14
Payer: MEDICARE

## 2024-06-28 ENCOUNTER — APPOINTMENT (OUTPATIENT)
Dept: PRIMARY CARE | Facility: CLINIC | Age: 69
End: 2024-06-28
Payer: MEDICARE

## 2024-11-06 ENCOUNTER — APPOINTMENT (OUTPATIENT)
Dept: PRIMARY CARE | Facility: CLINIC | Age: 69
End: 2024-11-06
Payer: MEDICARE